# Patient Record
Sex: MALE | Race: OTHER
[De-identification: names, ages, dates, MRNs, and addresses within clinical notes are randomized per-mention and may not be internally consistent; named-entity substitution may affect disease eponyms.]

---

## 2019-01-23 ENCOUNTER — HOSPITAL ENCOUNTER (OUTPATIENT)
Dept: HOSPITAL 72 - GAS | Age: 64
Discharge: HOME | End: 2019-01-23
Payer: COMMERCIAL

## 2019-01-23 VITALS — DIASTOLIC BLOOD PRESSURE: 78 MMHG | SYSTOLIC BLOOD PRESSURE: 113 MMHG

## 2019-01-23 VITALS — DIASTOLIC BLOOD PRESSURE: 79 MMHG | SYSTOLIC BLOOD PRESSURE: 115 MMHG

## 2019-01-23 VITALS — DIASTOLIC BLOOD PRESSURE: 80 MMHG | SYSTOLIC BLOOD PRESSURE: 115 MMHG

## 2019-01-23 VITALS — DIASTOLIC BLOOD PRESSURE: 78 MMHG | SYSTOLIC BLOOD PRESSURE: 114 MMHG

## 2019-01-23 VITALS — HEIGHT: 67 IN | BODY MASS INDEX: 23.86 KG/M2 | WEIGHT: 152 LBS

## 2019-01-23 VITALS — DIASTOLIC BLOOD PRESSURE: 70 MMHG | SYSTOLIC BLOOD PRESSURE: 122 MMHG

## 2019-01-23 VITALS — DIASTOLIC BLOOD PRESSURE: 77 MMHG | SYSTOLIC BLOOD PRESSURE: 117 MMHG

## 2019-01-23 VITALS — SYSTOLIC BLOOD PRESSURE: 121 MMHG | DIASTOLIC BLOOD PRESSURE: 80 MMHG

## 2019-01-23 VITALS — SYSTOLIC BLOOD PRESSURE: 107 MMHG | DIASTOLIC BLOOD PRESSURE: 71 MMHG

## 2019-01-23 DIAGNOSIS — D12.2: ICD-10-CM

## 2019-01-23 DIAGNOSIS — E11.9: ICD-10-CM

## 2019-01-23 DIAGNOSIS — Z79.84: ICD-10-CM

## 2019-01-23 DIAGNOSIS — K64.8: ICD-10-CM

## 2019-01-23 DIAGNOSIS — Z90.79: ICD-10-CM

## 2019-01-23 DIAGNOSIS — Z85.46: ICD-10-CM

## 2019-01-23 DIAGNOSIS — Z12.11: Primary | ICD-10-CM

## 2019-01-23 DIAGNOSIS — K57.30: ICD-10-CM

## 2019-01-23 DIAGNOSIS — F17.200: ICD-10-CM

## 2019-01-23 DIAGNOSIS — E78.00: ICD-10-CM

## 2019-01-23 DIAGNOSIS — K21.9: ICD-10-CM

## 2019-01-23 DIAGNOSIS — K29.50: ICD-10-CM

## 2019-01-23 DIAGNOSIS — K31.7: ICD-10-CM

## 2019-01-23 PROCEDURE — 93005 ELECTROCARDIOGRAM TRACING: CPT

## 2019-01-23 PROCEDURE — 45380 COLONOSCOPY AND BIOPSY: CPT

## 2019-01-23 PROCEDURE — 94003 VENT MGMT INPAT SUBQ DAY: CPT

## 2019-01-23 PROCEDURE — 43239 EGD BIOPSY SINGLE/MULTIPLE: CPT

## 2019-01-23 PROCEDURE — 82962 GLUCOSE BLOOD TEST: CPT

## 2019-01-23 PROCEDURE — 94150 VITAL CAPACITY TEST: CPT

## 2019-01-23 NOTE — SHORT STAY SURGERY H&P
History of Present Illness


History of Present Illness


Chief Complaint


screening colon, GERD


HPI


Leisa Bucio is a 63 year old male who was admitted on  for Abdominal Pain





Patient History


Allergies:  


Coded Allergies:  


     No Known Allergies (Unverified , 1/22/19)


PAST MEDICAL HISTORY:  


(1) Prostate CA


(2) Hypercholesteremia


(3) DM





Medication History


Scheduled


Aspirin Ec* (Aspirin Ec*), 81 MG ORAL DAILY, (Reported)


Citalopram Hydrobromide (Celexa), 10 MG ORAL DAILY, (Reported)


Ergocalciferol (Vitamin D2)* (Vitamin D*), 50,000 UNIT ORAL ONCE A WEEK, (

Reported)


Fluticasone Propionate (Flonase Allergy Relief), 9.9 ML NS DAILY, (Reported)


Lansoprazole* (Prevacid*), 30 MG ORAL DAILY, (Reported)


Mometasone Furoate (Nasonex), 2 SPRAYS NASAL DAILY, (Reported)


Rosuvastatin Calcium* (Crestor*), 10 MG ORAL DAILY, (Reported)





Miscellaneous Medications


Fluticasone/Vilanterol (Breo Ellipta 100-25 Mcg INH), 1 EACH IH, (Reported)





Discontinued Medications


Metformin Hcl* (Metformin Hcl*), 500 MG ORAL TWICE A DAY, (Reported)


   Discontinued Reason: Pt stopped taking med


Montelukast Sodium* (Singulair*), 10 MG ORAL DAILY, (Reported)


   Discontinued Reason: Pt stopped taking med


Zolpidem Tartrate* (Ambien*), 5 MG ORAL BEDTIME PRN for Insomnia, (Reported)


   Discontinued Reason: Pt stopped taking med





Review of Systems


Cardiovascular:  Reports: no symptoms


Respiratory:  Reports: no symptoms


Skeletal:  Reports: no symptoms


Gastrointestinal:  Reports: gastro esophageal reflux disease


Genitourinary:  Reports: no symptoms


Neurologic:  Reports: no symptoms


Endocrine:  Reports: no symptoms


Hematologic:  Reports: no symptoms





Physical Exam


Vital Signs





Last Vital Signs








  Date Time  Temp Pulse Resp B/P (MAP) Pulse Ox O2 Delivery O2 Flow Rate FiO2


 


1/23/19 09:04 98.0 71 20 117/77 97 Room Air  








Skin:  normal


HENT:  normal


Heart:  normal


Lungs:  normal


Abdomen:  normal


Extremities:  normal





Plan


Plan of Care


esophagogastroduodenoscopy and colonoscopy


Attestation


Are the patient's medical conditions optimized for surgery?


Attestation Response:  yes











Tres Burris MD 23, 2019 09:17

## 2019-01-23 NOTE — 48 HOUR POST ANESTHESIA EVAL
Post Anesthesia Evaluation


Procedure:  EGD, colonoscopy


Date of Evaluation:  Jan 23, 2019


Time of Evaluation:  11:05


Blood Pressure Systolic:  122


0:  70


Pulse Rate:  64


Respiratory Rate:  20


Temperature (Fahrenheit):  97.9


O2 Sat by Pulse Oximetry:  98


Airway:  patent


Nausea:  No


Vomiting:  No


Pain Intensity:  0


Hydration Status:  adequate


Cardiopulmonary Status:


stable


Mental Status/LOC:  patient returned to baseline


Follow-up Care/Observations:


per GI


Post-Anesthesia Complications:


none


Follow-up care needed:  ready to discharge











Mis Wallace CRNA Jan 23, 2019 11:06

## 2019-01-23 NOTE — PRE-PROCEDURE NOTE/ATTESTATION
Pre-Procedure Note/Attestation


Complete Prior to Procedure


Planned Procedure:  not applicable


Procedure Narrative:


esophagogastroduodenoscopy and colonoscopy





Indications for Procedure


Pre-Operative Diagnosis:


screening colonoscopy


GERD





Attestation


I attest that I discussed the nature of the procedure; its benefits; risks and 

complications; and alternatives (and the risks and benefits of such alternatives

), prior to the procedure, with the patient (or the patient's legal 

representative).





I attest that, if there was a reasonable possibility of needing a blood 

transfusion, the patient (or the patient's legal representative) was given the 

[facility] standardized written summary, pursuant 

to the [place] Blood Safety Act ([place] Health and Safety Code # 1645, as 

amended).





I attest that I re-evaluated the patient just prior to the surgery and that 

there has been no change in the patient's H&P, except as documented below:











Tres Burris MD Jan 23, 2019 09:18

## 2019-01-23 NOTE — PROCEDURE NOTE
DATE OF PROCEDURE:  01/23/2019

SURGEON:  Tres Burris M.D.



REFERRING PHYSICIAN:  Vanita Martino M.D.



PROCEDURE:  Upper endoscopy with biopsy and colonoscopy with

biopsy.



ANESTHESIA:  Per Mis RESENDIZ.



INSTRUMENT:  Olympus adult flexible upper endoscope and

colonoscope.



INDICATION:  Screening colonoscopy evaluation and chronic GERD.



REASON FOR PROCEDURE:  The procedure, risks, benefits, and possible

consequences, including hemorrhage, aspiration, perforation and infection,

and alternative treatments, were explained to the patient/legal guardian

by Dr. Tres Burris and the patient/legal guardian understood and

accepted these risks.



PROCEDURE IN DETAIL:  After informed consent was obtained and the patient

was adequately sedated, Olympus upper endoscope was advanced from mouth

into the second portion of the duodenum and retroflexion was performed in

the stomach.



The patient had diffuse gastritis.  Random biopsy from antrum and body

was obtained to rule out H. pylori infection.



The patient had evidence of some findings in esophagus suspicious but

not classical for eosinophilic esophagitis.  Biopsy from the distal

esophagus was obtained.  At this time, the upper endoscope was retrieved

and the patient was turned over for colonoscopy.



First, rectal exam was performed showed positive for internal

hemorrhoids.  Then, the scope was advanced from the rectum into the cecum

then subsequently in the terminal ileum.  Quality of prep was very good.



The patient had one diminutive polyp in the proximal ascending colon

removed with the cold biopsy forceps technique.  No further polyp was seen

in this colonoscopy examination.  There was some scattered diverticulosis

in the left colon.



Retroflexion of rectum showed evidence of medium-sized nonbleeding

internal hemorrhoids.  The patient tolerated the procedure very well

without any complication.



SUMMARY OF FINDINGS:

1. Gastritis, status post biopsy.

2. Possible eosinophilic esophagitis, status post biopsy.

3. One colonic polyp removed, see above for details.

4. Scattered diverticulosis.

5. Internal hemorrhoids.



RECOMMENDATIONS:

1. Follow up biopsy results and treat accordingly.

2. We will recommend repeat colonoscopy in 5 years.



I want to thank, Dr. Vanita Martino, for this kind referral.









  ______________________________________________

  Tres Burris M.D.





DR:  SAMANTHA

D:  01/23/2019 10:10

T:  01/23/2019 14:37

JOB#:  [id number]

CC:  Vanita Martino M.D.; Fax#:  [phone]

## 2019-01-23 NOTE — ANETHESIA PREOPERATIVE EVAL
Anesthesia Pre-op PMH/ROS


General


Date of Evaluation:  Jan 23, 2019


Time of Evaluation:  09:32


Anesthesiologist:  Mis Wallace CRNA


ASA Score:  ASA 2


Mallampati Score


Class I : Soft palate, uvula, fauces, pillars visible


Class II: Soft palate, uvula, fauces visible


Class III: Soft palate, base of uvula visible


Class IV: Only hard plate visible


Mallampati Classification:  Class III


Surgeon:  [place]


Diagnosis:  Abdominal pain


Surgical Procedure:  EGD and colonscopy screening


Anesthesia History:  none


Social History:  smoking


Family History:  no anesthesia problems


Allergies:  


Coded Allergies:  


     No Known Allergies (Unverified , 1/22/19)


Medications:  see eMAR


Patient NPO?:  Yes


NPO Date:  Jan 23, 2019


NPO Time:  00:00





Past Medical History


Cardiovascular:  Denies: HTN, CAD, MI, valve dz, arrhythmia, other


Pulmonary:  Reports: other - prostate CA s/p prostatectomy; 


   Denies: asthma, COPD, KEISHA


Gastrointestinal/Genitourinary:  Reports: GERD; 


   Denies: CRI, ESRD, other


Neurologic/Psychiatric:  Denies: dementia, CVA, depression/anxiety, TIA, other


Endocrine:  Reports: other - pre diabetes, benign adrenal mass s/p adrenalectomy


HEENT:  Denies: cataract (L), cataract (R), glaucoma, [place] (L), [place] (R), other


Hematology/Immune:  Denies: anemia, DVT, bleeding disorder, other


Musculoskeletal/Integumentary:  Denies: OA, RA, DJD, DDD, edema, other


PMH Narrative:


as above


PSxH Narrative:


as above





Anesthesia Pre-op Phys. Exam


Physician Exam





Last Vital Signs








  Date Time  Temp Pulse Resp B/P (MAP) Pulse Ox O2 Delivery O2 Flow Rate FiO2


 


1/23/19 09:04 98.0 71 20 117/77 97 Room Air  








Constitutional:  NAD


Neurologic:  CN 2-12 intact


Cardiovascular:  RRR


Respiratory:  CTA


Gastrointestinal:  S/NT/ND





Airway Exam


Mallampati Score:  Class III


MO:  full


Neck:  no issues


TMD:  > 3FB


ROM:  full


Teeth:  intact


Dentures:  no upper, no lower





Anesthesia Pre-op A/P


Studies


Pre-op Studies:  EKG - NSR





Risk Assessment & Plan


Assessment:


ASA 2 ok to proceed


Plan:


MAC


Status Change Before Surgery:  Yes





Pre-Antibiotics


Given Within 1 Hr of Incision:  No











Mis Wallace CRNA Jan 23, 2019 09:51

## 2019-01-23 NOTE — ENDOSCOPY PROCEDURE NOTE
Endoscopy Procedure Note


General


Indication for Procedure:  screening


Procedures Performed:  EGD, colonoscopy


Operative Findings/Diagnosis:  one polyp, gastritis


Specimen:  yes


Pt Tolerated Procedure Well:  Yes


Estimated Blood Loss:  none





Anesthesia


Anesthesiologist:  dennis


Anesthesia:  MAC





Inserted Devices


Implant(s) used?:  No





Quality


Quality of Bowel Preparation:  Good


Did scope reach the cecum?:  Yes


Was there any complications?:  No





GI Core Measures


50 yrs or older w/o bx or poly:  No


10yrs. F/U not recommended:  Yes


If not recommended, why?:  Above average risk


10 yrs. F/U needed:  Yes


18 years or older w/prev. colo:  No











Tres Burris MD Jan 23, 2019 10:10

## 2019-01-27 NOTE — CARDIOLOGY REPORT
--------------- APPROVED REPORT --------------





EKG Measurement

Heart [id number]

NM [id number]

[id number]

[id number]

[id number]





Normal sinus rhythm

Normal ECG

## 2023-07-31 NOTE — IMMEDIATE POST-OP EVALUATION
Immediate Post-Op Evalulation


Immediate Post-Op Evalulation


Procedure:  EGD, colonoscopy


Date of Evaluation:  Jan 23, 2019


Time of Evaluation:  10:12


IV Fluids:  0.9  ml


Blood Pressure Systolic:  107


Blood Pressure Diastolic:  71


Pulse Rate:  64


Respiratory Rate:  19


O2 Sat by Pulse Oximetry:  99


Temperature (Fahrenheit):  97.7


Pain Score (1-10):  0


Nausea:  No


Vomiting:  No


Complications


none


Patient Status:  awake, patent


Hydration Status:  adequate


Given Within 1 Hr of Incision:  Mis Shaw CRNA Jan 23, 2019 10:18 69.6